# Patient Record
Sex: FEMALE | Race: OTHER | Employment: FULL TIME | URBAN - METROPOLITAN AREA
[De-identification: names, ages, dates, MRNs, and addresses within clinical notes are randomized per-mention and may not be internally consistent; named-entity substitution may affect disease eponyms.]

---

## 2021-12-30 ENCOUNTER — HOSPITAL ENCOUNTER (OUTPATIENT)
Age: 61
Discharge: HOME | End: 2021-12-30
Payer: COMMERCIAL

## 2021-12-30 VITALS
HEART RATE: 83 BPM | DIASTOLIC BLOOD PRESSURE: 84 MMHG | SYSTOLIC BLOOD PRESSURE: 138 MMHG | OXYGEN SATURATION: 97 % | RESPIRATION RATE: 18 BRPM | TEMPERATURE: 99 F

## 2021-12-30 DIAGNOSIS — Z20.822 SUSPECTED COVID-19 VIRUS INFECTION: ICD-10-CM

## 2021-12-30 DIAGNOSIS — J01.10 ACUTE NON-RECURRENT FRONTAL SINUSITIS: Primary | ICD-10-CM

## 2021-12-30 PROCEDURE — 99203 OFFICE O/P NEW LOW 30 MIN: CPT | Performed by: NURSE PRACTITIONER

## 2021-12-31 NOTE — ED PROVIDER NOTES
Patient Seen in: [facility]      History   Patient presents with:  Nasal Congestion    Stated Complaint: congestion    Subjective:   81-year-old female presents to immediate care for nasal congestion runny nose.   Patient states symptoms starte Normal rate. Pulmonary:      Effort: Pulmonary effort is normal.      Breath sounds: Normal breath sounds. Abdominal:      General: Abdomen is flat. Musculoskeletal:         General: Normal range of motion. Skin:     General: Skin is warm and dry.

## 2022-01-04 LAB — SARS-COV-2 RNA,QUAL, RT-PCR: DETECTED

## 2024-02-14 PROBLEM — E03.9 PRIMARY HYPOTHYROIDISM: Status: ACTIVE | Noted: 2024-02-14

## 2024-02-14 PROBLEM — I10 ESSENTIAL HYPERTENSION: Status: ACTIVE | Noted: 2024-02-14

## 2024-02-14 PROBLEM — E78.2 MIXED HYPERLIPIDEMIA: Status: ACTIVE | Noted: 2024-02-14

## 2024-02-14 PROBLEM — E55.9 VITAMIN D DEFICIENCY: Status: ACTIVE | Noted: 2024-02-14

## 2024-02-14 PROBLEM — E03.9 PRIMARY HYPOTHYROIDISM: Status: ACTIVE | Noted: 2018-10-11

## 2024-02-14 PROBLEM — F41.1 GENERALIZED ANXIETY DISORDER: Status: ACTIVE | Noted: 2024-02-14

## 2024-02-14 PROBLEM — R73.09 ELEVATED HEMOGLOBIN A1C: Status: ACTIVE | Noted: 2024-02-14

## 2024-07-05 PROCEDURE — 93010 ELECTROCARDIOGRAM REPORT: CPT | Performed by: INTERNAL MEDICINE
